# Patient Record
(demographics unavailable — no encounter records)

---

## 2021-09-25 NOTE — PCM.DEL
L & D Note





- General Info


Date of Service: 21





- Delivery Note


Labor: Induced by ARM, Induced by Oxytocin


Delivery Outcome: Livebirth


Infant Delivery Method: Spontaneous Vaginal Delivery-Single


Infant Delivery Mode: Spontaneous


Birth Presentation: Left Occiput Anterior (ACE)


Nuchal Cord: None


Anesthesia Type: Epidural


Amniotic Fluid Description: Clear


Episiotomy Type: None


Laceration: None


Placenta: Intact, Spontaneous


Cord: 3 Vessels


Estimated Blood Loss: 100


Resuscitation Needed: Yes


[place]: Bulb Syringe, Stimulated, Warmed, [place] Used


Delivery Comments (Free Text/Narrative):: 





Patient found to be complete and began pushing.  With maternal pushing effort 

fetal head delivered from CAE presentation.  No nuchal cord present.  With 

gentle downward traction the fetal shoulders and body delivered.  Infant placed 

on maternal abdomen.  Cord clamped and cut.  Cord blood obtained.  Placenta al

lowed time to separate and expelled intact.  inspection of perineum with no 

lacerations 





- General Info


Date of Service: 21





- Patient Data


Vitals - Most Recent: 


                                Last Vital Signs











Temp  37.1 C   21 08:33


 


Pulse  78   21 08:33


 


Resp  16   21 08:33


 


BP  113/63   21 08:33


 


Pulse Ox  100   21 08:33











Weight - Most Recent: 86.954 kg


I&O - Last 24 Hours: 


                                 Intake & Output











 21





 22:59 06:59 14:59


 


Intake Total   2440


 


Balance   2440














- Exam


Urinary Catheter Total Time: 0Days  1Hours





- Problem List & Annotations


(1) 39 weeks gestation of pregnancy


SNOMED Code(s): 89565848


   Code(s): Z3A.39 - 39 WEEKS GESTATION OF PREGNANCY   Status: Acute   Current 

Visit: Yes   





(2) Vaginal delivery


SNOMED Code(s): 831812878


   Code(s): O80 - ENCOUNTER FOR FULL-TERM UNCOMPLICATED DELIVERY   Status: Acute

  Current Visit: No   





- Problem List Review


Problem List Initiated/Reviewed/Updated: Yes





- My Orders


Last 24 Hours: 


My Active Orders





21 Breakfast


Regular Diet [DIET] 





21 07:13


Activity as Tolerated [RC] PFP 


Communication Order [RC] ASDIRECTED 


Fetal Non Stress Test [RC] PER UNIT ROUTINE 


Notify Provider [RC] PFP 


Notify Provider [RC] PRN 


Pump Management, Intrathecal [RC] ASDIRECTED 


Urinary Catheter Assessment [RC] ASDIRECTED 


Vital Signs [RC] PER UNIT ROUTINE 


Calcium Carbonate [Tums]   1,000 mg PO Q2H PRN 


Nalbuphine [Nubain]   10 mg IVPUSH Q2H PRN 


Ondansetron [Zofran]   4 mg IVPUSH Q4H PRN 


Sodium Chloride 0.9% [Saline Flush]   10 ml FLUSH ASDIRECTED PRN 


Electronic Fetal Heart Tones Ext w TOCO [WOMSER] Routine 


Electronic Fetal Heart Tones Internal [WOMSER] Per Unit Routine 


Peripheral IV Insertion Adult [OM.PC] Routine 


Resuscitation Status Routine 





21 07:14


Patient Status [ADT] Routine 


Fetal Heart Tones [RC] ASDIRECTED 


Peripheral IV Care [RC] .AS DIRECTED 





21 07:15


Lactated Ringers [Ringers, Lactated] 1,000 ml IV ASDIRECTED 


Oxytocin/Lactated Ringers [Pitocin in LR 10 Units/1,000 ML] 10 unit in 1,000 ml 

IV .CONTINUOUS 


Oxytocin/Lactated Ringers [Pitocin in LR 10 Units/1,000 ML] 10 unit in 1,000 ml 

IV TITRATE 





21 07:50


HEP C VIRUS AB [REF] Stat 


RAPID PLASMA REAGIN,RPR [CHEM] Routine 














- Assessment


Assessment:: 





PPD#0





- Plan


Plan:: 





* Routine cares 


* Breast feeding 


* Discharge home in 1-2 days

## 2021-09-25 NOTE — PCM.LDHP
L&D History of Present Illness





- General


Date of Service: 21


Admit Problem/Dx: 


                   Patient Status Order with Admit Dx/Problem





21 07:14


Patient Status [ADT] Routine 








                           Admission Diagnosis/Problem











Admission Diagnosis/Problem    Pregnancy














Source of Information: Patient


History Limitations: Reports: No Limitations





- History of Present Illness


Introduction:: 





Patient is a 26 y/o  at 39 0/7 wks who presents for elective IOL.  Doing

 well today.  No concerns 





- Related Data


Allergies/Adverse Reactions: 


                                    Allergies











Allergy/AdvReac Type Severity Reaction Status Date / Time


 


No Known Allergies Allergy   Verified 19 20:37











Home Medications: 


                                    Home Meds





Pnv No.95/Ferrous Fum/Folic AC [Prenatal Tablet] 1 tab PO DAILY 19 

[History]


Acetaminophen [Tylenol] 650 mg PO Q4H PRN  tablet 19 [Rx]


Ibuprofen [Motrin] 600 mg PO Q6H PRN  tablet 19 [Rx]











Past Medical History


OB/GYN History: Reports: Pregnancy


: 2


Para: 1


LMP (Approximate): Pregnant





- Infectious Disease History


Infectious Disease History: Reports: Herpes


Other Infectious Disease History: no active outbreak in 3 years





- Past Surgical History


HEENT Surgical History: Reports: Oral Surgery





Social & Family History





- Family History


Family Medical History: No Pertinent Family History





- Tobacco Use


Tobacco Use Status *Q: Never Tobacco User





- Caffeine Use


Caffeine Use: Reports: None





- Alcohol Use


Alcohol Use History: No





- Recreational Drug Use


Recreational Drug Use: No





H&P Review of Systems





- Review of Systems:


Review Of Systems: See Below


General: Reports: No Symptoms


Pulmonary: Reports: No Symptoms


Cardiovascular: Reports: No Symptoms


Gastrointestinal: Reports: No Symptoms


Genitourinary: Reports: No Symptoms


Musculoskeletal: Reports: No Symptoms


Psychiatric: Reports: No Symptoms





L&D Exam





- Exam


Exam: See Below





- Vital Signs


Vital Signs: 


                                Last Vital Signs











Temp  37.1 C   21 08:33


 


Pulse  78   21 08:33


 


Resp  16   21 08:33


 


BP  113/63   21 08:33


 


Pulse Ox  100   21 08:33











Weight: 86.954 kg





- OB Specific


Contraction Intensity: Irritability


Fetal Movement: Active


Fetal Heart Tones: Present


Fetal Heart Tones per Min: 140


Fetal Heart Rate (FHR) Variability: Moderate (6-25 bpm)


Birth Presentation: Vertex





- Leavitt Score


Leavitt Score Cervix Position: Midposition


Leavitt Score Consistency: Soft


Leavitt Score Effacement: 51-70%


Leavitt Score Dilation: 3-4 cm


Leavitt Score Infant's Station: -2


Leavitt Score Total: 8





- Exam


General: Alert, Oriented, Cooperative


Lungs: Clear to Auscultation, Normal Respiratory Effort


Cardiovascular: Regular Rate, Regular Rhythm


GI/Abdominal Exam: Soft, Non-Tender


Genitourinary: Normal external exam


Extremities: Normal Inspection


Skin: Warm, Dry, Intact





- Patient Data


Lab Results Last 24 hrs: 


                         Laboratory Results - last 24 hr











  21 Range/Units





  07:50 


 


WBC  9.91  (3.98-10.04)  K/mm3


 


RBC  3.87 L  (3.98-5.22)  M/mm3


 


Hgb  10.9 L  (11.2-15.7)  gm/dl


 


Hct  34.6  (34.1-44.9)  %


 


MCV  89.4  (79.4-94.8)  fl


 


MCH  28.2  (25.6-32.2)  pg


 


MCHC  31.5 L  (32.2-35.5)  g/dl


 


RDW Std Deviation  43.0  (36.4-46.3)  fL


 


Plt Count  220  (182-369)  K/mm3


 


MPV  12.4 H  (9.4-12.3)  fl


 


Neut % (Auto)  62.0  (34.0-71.1)  %


 


Lymph % (Auto)  27.3  (19.3-51.7)  %


 


Mono % (Auto)  9.1  (4.7-12.5)  %


 


Eos % (Auto)  1.1  (0.7-5.8)  


 


Baso % (Auto)  0.2  (0.1-1.2)  %


 


Neut # (Auto)  6.14 H  (1.56-6.13)  K/mm3


 


Lymph # (Auto)  2.71  (1.18-3.74)  K/mm3


 


Mono # (Auto)  0.90 H  (0.24-0.36)  K/mm3


 


Eos # (Auto)  0.11  (0.04-0.36)  K/mm3


 


Baso # (Auto)  0.02  (0.01-0.08)  K/mm3











Result Diagrams: 


                                 21 07:50








- Problem List


(1) 39 weeks gestation of pregnancy


SNOMED Code(s): 25391354


   ICD Code: Z3A.39 - 39 WEEKS GESTATION OF PREGNANCY   Status: Acute   Current 

Visit: Yes   


Problem List Initiated/Reviewed/Updated: Yes


Orders Last 24hrs: 


                               Active Orders 24 hr











 Category Date Time Status


 


 Patient Status [ADT] Routine ADT  21 07:14 Active


 


 Activity as Tolerated [RC] PFP Care  21 07:13 Active


 


 Communication Order [RC] ASDIRECTED Care  21 07:13 Active


 


 Fetal Heart Tones [RC] ASDIRECTED Care  21 07:14 Active


 


 Fetal Non Stress Test [RC] PER UNIT ROUTINE Care  21 07:13 Active


 


 Notify Provider [RC] PFP Care  21 07:13 Active


 


 Notify Provider [RC] PRN Care  21 07:13 Active


 


 Peripheral IV Care [RC] .AS DIRECTED Care  21 07:14 Active


 


 Pump Management, Intrathecal [RC] ASDIRECTED Care  21 07:13 Active


 


 Urinary Catheter Assessment [RC] ASDIRECTED Care  21 07:13 Active


 


 Vital Signs [RC] PER UNIT ROUTINE Care  21 07:13 Active


 


 Regular Diet [DIET] Diet  21 Breakfast Active


 


 CORONAVIRUS COVID-19 CAROL [MOLEC] Stat Lab  21 07:46 Received


 


 HEP C VIRUS AB [REF] Stat Lab  21 07:50 Received


 


 RAPID PLASMA REAGIN,RPR [CHEM] Routine Lab  21 07:50 Received


 


 TYPE AND SCREEN [BBK] Stat Lab  21 07:50 Received


 


 Calcium Carbonate [Tums] Med  21 07:13 Active





 1,000 mg PO Q2H PRN   


 


 Lactated Ringers [Ringers, Lactated] 1,000 ml Med  21 07:15 Active





 IV ASDIRECTED   


 


 Nalbuphine [Nubain] Med  21 07:13 Active





 10 mg IVPUSH Q2H PRN   


 


 Ondansetron [Zofran] Med  21 07:13 Active





 4 mg IVPUSH Q4H PRN   


 


 Oxytocin/Lactated Ringers [Pitocin in LR 10 Units/1,000 Med  21 07:15 

Active





 ML]   





 10 unit in 1,000 ml IV .CONTINUOUS   


 


 Oxytocin/Lactated Ringers [Pitocin in LR 10 Units/1,000 Med  21 07:15 

Active





 ML]   





 10 unit in 1,000 ml IV TITRATE   


 


 Sodium Chloride 0.9% [Saline Flush] Med  21 07:13 Active





 10 ml FLUSH ASDIRECTED PRN   


 


 Electronic Fetal Heart Tones Ext w TOCO [WOMSER] Oth  21 07:13 Ordered





 Routine   


 


 Electronic Fetal Heart Tones Internal [WOMSER] Per Unit Ot  21 07:13 

Ordered





 Routine   


 


 Peripheral IV Insertion Adult [OM.PC] Routine Oth  21 07:13 Ordered


 


 Resuscitation Status Routine Resus Stat  21 07:13 Ordered








                                Medication Orders





Calcium Carbonate/Glycine (Calcium Carbonate 500 Mg Tab.Chew)  1,000 mg PO Q2H 

PRN


   PRN Reason: Indigestion


Oxytocin/Lactated Ringer's (Pitocin In Lr 10 Units/1,000 Ml)  10 unit in 1,000 

mls @ 12 mls/hr IV TITRATE BHUPINDER; Protocol


Oxytocin/Lactated Ringer's (Pitocin In Lr 10 Units/1,000 Ml)  10 unit in 1,000 

mls @ 500 mls/hr IV .CONTINUOUS BHUPINDER


Lactated Ringer's (Ringers, Lactated)  1,000 mls @ 100 mls/hr IV ASDIRECTED BHUPINDER


Nalbuphine HCl (Nalbuphine 10 Mg/1 Ml Vial)  10 mg IVPUSH Q2H PRN


   PRN Reason: Pain


Ondansetron HCl (Ondansetron 4 Mg/2 Ml Sdv)  4 mg IVPUSH Q4H PRN


   PRN Reason: Nausea/Vomiting


Sodium Chloride (Sodium Chloride 0.9% 10 Ml Syringe)  10 ml FLUSH ASDIRECTED PRN


   PRN Reason: Keep Vein Open








Assessment/Plan Comment:: 





* Labs 


* GBS negative


* AROM for IOL, pitocin if needed 


* Pain management per patient preference 


* Anticipate

## 2021-09-25 NOTE — PCM.PREANE
Preanesthetic Assessment





- Procedure


Proposed Procedure: 





anila





- Anesthesia/Transfusion/Family Hx


Anesthesia History: Prior Anesthesia Without Reaction


Family History of Anesthesia Reaction: No


Transfusion History: No Prior Transfusion(s)





- Review of Systems


General: No Symptoms


Pulmonary: No Symptoms


Cardiovascular: No Symptoms


Gastrointestinal: No Symptoms


Neurological: No Symptoms


Other: Reports: None





- Physical Assessment


Vital Signs: 





                                Last Vital Signs











Temp  98.8 F   09/25/21 08:33


 


Pulse  78   09/25/21 08:33


 


Resp  16   09/25/21 08:33


 


BP  113/63   09/25/21 08:33


 


Pulse Ox  100   09/25/21 08:33











Height: 5 ft 8 in


Weight: 86.954 kg


ASA Class: 2


Mental Status: Alert & Oriented x3


Airway Class: Mallampati = 2


Dentition: Reports: Normal Dentition


Thyro-Mental Finger Breadths: 3


Mouth Opening Finger Breadths: 3


ROM/Head Extension: Full


Lungs: Clear to Auscultation, Normal Respiratory Effort


Cardiovascular: Regular Rate, Regular Rhythm





- Lab


Values: 





                             Laboratory Last Values











WBC  9.91 K/mm3 (3.98-10.04)   09/25/21  07:50    


 


RBC  3.87 M/mm3 (3.98-5.22)  L  09/25/21  07:50    


 


Hgb  10.9 gm/dl (11.2-15.7)  L  09/25/21  07:50    


 


Hct  34.6 % (34.1-44.9)   09/25/21  07:50    


 


MCV  89.4 fl (79.4-94.8)   09/25/21  07:50    


 


MCH  28.2 pg (25.6-32.2)   09/25/21  07:50    


 


MCHC  31.5 g/dl (32.2-35.5)  L  09/25/21  07:50    


 


RDW Std Deviation  43.0 fL (36.4-46.3)   09/25/21  07:50    


 


Plt Count  220 K/mm3 (182-369)   09/25/21  07:50    


 


MPV  12.4 fl (9.4-12.3)  H  09/25/21  07:50    


 


Neut % (Auto)  62.0 % (34.0-71.1)   09/25/21  07:50    


 


Lymph % (Auto)  27.3 % (19.3-51.7)   09/25/21  07:50    


 


Mono % (Auto)  9.1 % (4.7-12.5)   09/25/21  07:50    


 


Eos % (Auto)  1.1  (0.7-5.8)   09/25/21  07:50    


 


Baso % (Auto)  0.2 % (0.1-1.2)   09/25/21  07:50    


 


Neut # (Auto)  6.14 K/mm3 (1.56-6.13)  H  09/25/21  07:50    


 


Lymph # (Auto)  2.71 K/mm3 (1.18-3.74)   09/25/21  07:50    


 


Mono # (Auto)  0.90 K/mm3 (0.24-0.36)  H  09/25/21  07:50    


 


Eos # (Auto)  0.11 K/mm3 (0.04-0.36)   09/25/21  07:50    


 


Baso # (Auto)  0.02 K/mm3 (0.01-0.08)   09/25/21  07:50    


 


SARS-CoV-2 RNA (CAROL)  Negative  (NEGATIVE)   09/25/21  07:46    


 


Blood Type  B POSITIVE   09/25/21  07:50    


 


Gel Antibody Screen  Negative   09/25/21  07:50    














- Allergies


Allergies/Adverse Reactions: 


                                    Allergies











Allergy/AdvReac Type Severity Reaction Status Date / Time


 


No Known Allergies Allergy   Verified 01/24/19 20:37














- Blood


Blood Available: No





- Acknowledgements


Anesthesia Type Planned: Epidural


Pt an Appropriate Candidate for the Planned Anesthesia: Yes


Alternatives and Risks of Anesthesia Discussed w Pt/Guardian: Yes


Pt/Guardian Understands and Agrees with Anesthesia Plan: Yes





PreAnesthesia Questionnaire





- Past Health History


Medical/Surgical History: Denies Medical/Surgical History


Cardiovascular History: Reports: None


Respiratory History: Reports: None


OB/GYN History: Reports: Pregnancy


Psychiatric History: Reports: None





- Infectious Disease History


Infectious Disease History: Reports: Herpes


Other Infectious Disease History: no active outbreak in 3 years





- Past Surgical History


HEENT Surgical History: Reports: Oral Surgery





- SUBSTANCE USE


Tobacco Use Status *Q: Never Tobacco User


Tobacco Use Within Last Twelve Months: No


Second Hand Smoke Exposure: No


Days Per Week of Alcohol Use: 0


Recreational Drug Use History: No





- HOME MEDS


Home Medications: 


                                    Home Meds





Pnv No.95/Ferrous Fum/Folic AC [Prenatal Tablet] 1 tab PO DAILY 01/24/19 

[History]


Acetaminophen [Tylenol] 650 mg PO Q4H PRN  tablet 01/26/19 [Rx]


Ibuprofen [Motrin] 600 mg PO Q6H PRN  tablet 01/26/19 [Rx]











- CURRENT (IN HOUSE) MEDS


Current Meds: 





                               Current Medications





Calcium Carbonate/Glycine (Calcium Carbonate 500 Mg Tab.Chew)  1,000 mg PO Q2H 

PRN


   PRN Reason: Indigestion


Diphenhydramine HCl (Diphenhydramine 50 Mg/Ml Sdv)  25 mg IVPUSH Q6H PRN


   PRN Reason: pruritis


Ephedrine Sulfate (Ephedrine 50 Mg/Ml Sdv)  5 mg IVPUSH ASDIRECTED PRN


   PRN Reason: Hypotension


Fentanyl (Fentanyl 100 Mcg/2 Ml Sdv)  100 mcg EPIDUR Q3H PRN


   PRN Reason: Pain


   Last Admin: 09/25/21 10:44 Dose:  100 mcg


   Documented by: 


Fentanyl/Bupivacaine HCl (Bupivacaine/Fentanyl/Ns 100 Ml Bag)  100 ml EPIDUR 

ASDIRECTED PRN


   PRN Reason: Pain


Oxytocin/Lactated Ringer's (Pitocin In Lr 10 Units/1,000 Ml)  10 unit in 1,000 

mls @ 12 mls/hr IV TITRATE BHUPINDER; Protocol


Oxytocin/Lactated Ringer's (Pitocin In Lr 10 Units/1,000 Ml)  10 unit in 1,000 

mls @ 500 mls/hr IV .CONTINUOUS BHUPINDER


Lactated Ringer's (Ringers, Lactated)  1,000 mls @ 100 mls/hr IV ASDIRECTED BHUPINDER


   Last Admin: 09/25/21 10:46 Dose:  100 mls/hr


   Documented by: 


Nalbuphine HCl (Nalbuphine 10 Mg/1 Ml Vial)  10 mg IVPUSH Q2H PRN


   PRN Reason: Pain


Ondansetron HCl (Ondansetron 4 Mg/2 Ml Sdv)  4 mg IVPUSH Q4H PRN


   PRN Reason: Nausea/Vomiting


Sodium Chloride (Sodium Chloride 0.9% 10 Ml Syringe)  10 ml FLUSH ASDIRECTED PRN


   PRN Reason: Keep Vein Open

## 2021-09-26 NOTE — PCM.[email or web address]
**Discharge Summary





- Discharge Data


Discharge Date: 21


Discharge Disposition: Home, Self-Care 01


Condition: Good





- Referral to Home Health


Primary Care Physician: 


Heather Chung MD








- Discharge Diagnosis/Problem(s)


(1) 39 weeks gestation of pregnancy


SNOMED Code(s): 86160919


   ICD Code: Z3A.39 - 39 WEEKS GESTATION OF PREGNANCY   Status: Acute   Current 

Visit: Yes   





(2) Vaginal delivery


SNOMED Code(s): 620128950


   ICD Code: O80 - ENCOUNTER FOR FULL-TERM UNCOMPLICATED DELIVERY   Status: 

Acute   Current Visit: No   





- Patient Summary/Data


Complications: None


Consults: 


None


Recommended Follow-up Testing/Procedures: 


Follow up in 3 weeks for postpartum check 


Hospital Course: 





28 y/o  at 39 0/7 wks presented for elective IOL.  Pitocin and AROM done

for IOL.  Progressed well to complete dilation.  Underwent an uncomplicated .

 See delivery note.  Postpartum did well and was discharged home on PPD#1





- Patient Instructions


Diet: Regular Diet as Tolerated


Activity: As Tolerated


Activity, Other: Pelvic rest for 6 weeks 


Driving: May Drive Today


Showering/Bathing: May Shower


Showering/Bathing, Other: May Bathe 


Notify Provider of: Fever, Increased Pain, Swelling and Redness, Drainage, 

Nausea and/or Vomiting





- Discharge Plan


*PRESCRIPTION DRUG MONITORING PROGRAM REVIEWED*: No


*COPY OF PRESCRIPTION DRUG MONITORING REPORT IN PATIENT [facility]: No


Home Medications: 


                                    Home Meds





Pnv No.95/Ferrous Fum/Folic AC [Prenatal Tablet] 1 tab PO DAILY 19 

[History]


Acetaminophen [Tylenol] 650 mg PO Q4H PRN  tablet 19 [Rx]


Ibuprofen [Motrin] 600 mg PO Q6H PRN  tablet 19 [Rx]


Docusate Sodium [Colace] 100 mg PO BID PRN  cap 21 [Rx]








Referrals: 


Heather Chung MD [Primary Care Provider] -  (3 weeks for postpartum check )





- Discharge Summary/Plan Comment


DC Time >30 min.: No


Total # of Minutes for Discharge Time: 


15





- Patient Data


Vitals - Most Recent: 


                                Last Vital Signs











Temp  36.7 C   21 02:52


 


Pulse  87   21 02:53


 


Resp  14   21 02:52


 


BP  110/73   21 02:53


 


Pulse Ox  100   21 02:53











Weight - Most Recent: 86.954 kg


I&O - Last 24 hours: 


                                 Intake & Output











 21





 14:59 22:59 06:59


 


Intake Total 2440 1680 


 


Output Total 350  


 


Balance 2090 1680 











Lab Results - Last 24 hrs: 


                         Laboratory Results - last 24 hr











  21 Range/Units





  07:46 07:50 07:50 


 


WBC    9.91  (3.98-10.04)  K/mm3


 


RBC    3.87 L  (3.98-5.22)  M/mm3


 


Hgb    10.9 L  (11.2-15.7)  gm/dl


 


Hct    34.6  (34.1-44.9)  %


 


MCV    89.4  (79.4-94.8)  fl


 


MCH    28.2  (25.6-32.2)  pg


 


MCHC    31.5 L  (32.2-35.5)  g/dl


 


RDW Std Deviation    43.0  (36.4-46.3)  fL


 


Plt Count    220  (182-369)  K/mm3


 


MPV    12.4 H  (9.4-12.3)  fl


 


Neut % (Auto)    62.0  (34.0-71.1)  %


 


Lymph % (Auto)    27.3  (19.3-51.7)  %


 


Mono % (Auto)    9.1  (4.7-12.5)  %


 


Eos % (Auto)    1.1  (0.7-5.8)  


 


Baso % (Auto)    0.2  (0.1-1.2)  %


 


Neut # (Auto)    6.14 H  (1.56-6.13)  K/mm3


 


Lymph # (Auto)    2.71  (1.18-3.74)  K/mm3


 


Mono # (Auto)    0.90 H  (0.24-0.36)  K/mm3


 


Eos # (Auto)    0.11  (0.04-0.36)  K/mm3


 


Baso # (Auto)    0.02  (0.01-0.08)  K/mm3


 


RPR   Non-reactive   (NONREACTIVE)  


 


SARS-CoV-2 RNA (CAROL)  Negative    (NEGATIVE)  


 


Blood Type     


 


Gel Antibody Screen     














  21 Range/Units





  07:50 


 


WBC   (3.98-10.04)  K/mm3


 


RBC   (3.98-5.22)  M/mm3


 


Hgb   (11.2-15.7)  gm/dl


 


Hct   (34.1-44.9)  %


 


MCV   (79.4-94.8)  fl


 


MCH   (25.6-32.2)  pg


 


MCHC   (32.2-35.5)  g/dl


 


RDW Std Deviation   (36.4-46.3)  fL


 


Plt Count   (182-369)  K/mm3


 


MPV   (9.4-12.3)  fl


 


Neut % (Auto)   (34.0-71.1)  %


 


Lymph % (Auto)   (19.3-51.7)  %


 


Mono % (Auto)   (4.7-12.5)  %


 


Eos % (Auto)   (0.7-5.8)  


 


Baso % (Auto)   (0.1-1.2)  %


 


Neut # (Auto)   (1.56-6.13)  K/mm3


 


Lymph # (Auto)   (1.18-3.74)  K/mm3


 


Mono # (Auto)   (0.24-0.36)  K/mm3


 


Eos # (Auto)   (0.04-0.36)  K/mm3


 


Baso # (Auto)   (0.01-0.08)  K/mm3


 


RPR   (NONREACTIVE)  


 


SARS-CoV-2 RNA (CAROL)   (NEGATIVE)  


 


Blood Type  B POSITIVE  


 


Gel Antibody Screen  Negative  











Med Orders - Current: 


                               Current Medications





Acetaminophen (Acetaminophen 325 Mg Tab)  650 mg PO Q4H PRN


   PRN Reason: mild pain or fever


Benzocaine/Menthol (Benzocaine/Menthol 20%-0.5% Spray 78 Gm Cannister)  0 gm TOP

ASDIRECTED PRN


   PRN Reason: Perineal Comfort Measure


Docusate Sodium (Docusate Sodium 100 Mg Cap)  100 mg PO BID PRN


   PRN Reason: Constipation


Ibuprofen (Ibuprofen 600 Mg Tab)  600 mg PO Q6H PRN


   PRN Reason: Mild pain or fever


   Last Admin: 21 17:56 Dose:  600 mg


   Documented by: 


Witch Hazel (Witch Hazel Medicated Pads 40/Jar)  1 pad TOP ASDIRECTED PRN


   PRN Reason: Perineal Comfort Measure





Discontinued Medications





Calcium Carbonate/Glycine (Calcium Carbonate 500 Mg Tab.Chew)  1,000 mg PO Q2H 

PRN


   PRN Reason: Indigestion


Diphenhydramine HCl (Diphenhydramine 50 Mg/Ml Sdv)  25 mg IVPUSH Q6H PRN


   PRN Reason: pruritis


Ephedrine Sulfate (Ephedrine 50 Mg/Ml Sdv)  5 mg IVPUSH ASDIRECTED PRN


   PRN Reason: Hypotension


Fentanyl (Fentanyl 100 Mcg/2 Ml Sdv)  100 mcg EPIDUR Q3H PRN


   PRN Reason: Pain


   Last Admin: 21 10:44 Dose:  100 mcg


   Documented by: 


Fentanyl/Bupivacaine HCl (Bupivacaine/Fentanyl/Ns 100 Ml Bag)  100 ml EPIDUR 

ASDIRECTED PRN


   PRN Reason: Pain


   Last Admin: 21 11:13 Dose:  100 ml


   Documented by: 


Oxytocin/Lactated Ringer's (Pitocin In Lr 10 Units/1,000 Ml)  10 unit in 1,000 

mls @ 12 mls/hr IV TITRATE BHUPINDER; Protocol


   Last Titration: 21 13:49 Dose:  6 munits/min, 36 mls/hr


   Documented by: 


Oxytocin/Lactated Ringer's (Pitocin In Lr 10 Units/1,000 Ml)  10 unit in 1,000 

mls @ 500 mls/hr IV .CONTINUOUS BHUPINDER


Lactated Ringer's (Ringers, Lactated)  1,000 mls @ 100 mls/hr IV ASDIRECTED BHUPINDER


   Last Admin: 21 12:59 Dose:  100 mls/hr


   Documented by: 


Nalbuphine HCl (Nalbuphine 10 Mg/1 Ml Vial)  10 mg IVPUSH Q2H PRN


   PRN Reason: Pain


Ondansetron HCl (Ondansetron 4 Mg/2 Ml Sdv)  4 mg IVPUSH Q4H PRN


   PRN Reason: Nausea/Vomiting


Sodium Chloride (Sodium Chloride 0.9% 10 Ml Syringe)  10 ml FLUSH ASDIRECTED PRN


   PRN Reason: Keep Vein Open

## 2021-09-26 NOTE — PCM.PNPP
- General Info


Date of Service: 21


Functional Status: Reports: Pain Controlled, Tolerating Diet, Ambulating, 

Urinating





- Review of Systems


General: Reports: No Symptoms


Pulmonary: Reports: No Symptoms


Cardiovascular: Reports: No Symptoms


Gastrointestinal: Reports: No Symptoms


Genitourinary: Reports: No Symptoms


Musculoskeletal: Reports: No Symptoms


Neurological: Reports: No Symptoms





- General Info


Date of Service: 21





- Patient Data


Vital Signs - Most Recent: 


                                Last Vital Signs











Temp  36.7 C   21 02:52


 


Pulse  87   21 02:53


 


Resp  14   21 02:52


 


BP  110/73   21 02:53


 


Pulse Ox  100   21 02:53











Weight - Most Recent: 86.954 kg


I&O - Last 24 Hours: 


                                 Intake & Output











 21





 14:59 22:59 06:59


 


Intake Total 2440 1680 


 


Output Total 350  


 


Balance 2090 1680 











Lab Results - Last 24 Hours: 


                         Laboratory Results - last 24 hr











  21 Range/Units





  07:46 07:50 07:50 


 


WBC    9.91  (3.98-10.04)  K/mm3


 


RBC    3.87 L  (3.98-5.22)  M/mm3


 


Hgb    10.9 L  (11.2-15.7)  gm/dl


 


Hct    34.6  (34.1-44.9)  %


 


MCV    89.4  (79.4-94.8)  fl


 


MCH    28.2  (25.6-32.2)  pg


 


MCHC    31.5 L  (32.2-35.5)  g/dl


 


RDW Std Deviation    43.0  (36.4-46.3)  fL


 


Plt Count    220  (182-369)  K/mm3


 


MPV    12.4 H  (9.4-12.3)  fl


 


Neut % (Auto)    62.0  (34.0-71.1)  %


 


Lymph % (Auto)    27.3  (19.3-51.7)  %


 


Mono % (Auto)    9.1  (4.7-12.5)  %


 


Eos % (Auto)    1.1  (0.7-5.8)  


 


Baso % (Auto)    0.2  (0.1-1.2)  %


 


Neut # (Auto)    6.14 H  (1.56-6.13)  K/mm3


 


Lymph # (Auto)    2.71  (1.18-3.74)  K/mm3


 


Mono # (Auto)    0.90 H  (0.24-0.36)  K/mm3


 


Eos # (Auto)    0.11  (0.04-0.36)  K/mm3


 


Baso # (Auto)    0.02  (0.01-0.08)  K/mm3


 


RPR   Non-reactive   (NONREACTIVE)  


 


SARS-CoV-2 RNA (CAROL)  Negative    (NEGATIVE)  


 


Blood Type     


 


Gel Antibody Screen     














  21 Range/Units





  07:50 


 


WBC   (3.98-10.04)  K/mm3


 


RBC   (3.98-5.22)  M/mm3


 


Hgb   (11.2-15.7)  gm/dl


 


Hct   (34.1-44.9)  %


 


MCV   (79.4-94.8)  fl


 


MCH   (25.6-32.2)  pg


 


MCHC   (32.2-35.5)  g/dl


 


RDW Std Deviation   (36.4-46.3)  fL


 


Plt Count   (182-369)  K/mm3


 


MPV   (9.4-12.3)  fl


 


Neut % (Auto)   (34.0-71.1)  %


 


Lymph % (Auto)   (19.3-51.7)  %


 


Mono % (Auto)   (4.7-12.5)  %


 


Eos % (Auto)   (0.7-5.8)  


 


Baso % (Auto)   (0.1-1.2)  %


 


Neut # (Auto)   (1.56-6.13)  K/mm3


 


Lymph # (Auto)   (1.18-3.74)  K/mm3


 


Mono # (Auto)   (0.24-0.36)  K/mm3


 


Eos # (Auto)   (0.04-0.36)  K/mm3


 


Baso # (Auto)   (0.01-0.08)  K/mm3


 


RPR   (NONREACTIVE)  


 


SARS-CoV-2 RNA (CAROL)   (NEGATIVE)  


 


Blood Type  B POSITIVE  


 


Gel Antibody Screen  Negative  











Med Orders - Current: 


                               Current Medications





Acetaminophen (Acetaminophen 325 Mg Tab)  650 mg PO Q4H PRN


   PRN Reason: mild pain or fever


Benzocaine/Menthol (Benzocaine/Menthol 20%-0.5% Spray 78 Gm Cannister)  0 gm TOP

ASDIRECTED PRN


   PRN Reason: Perineal Comfort Measure


Docusate Sodium (Docusate Sodium 100 Mg Cap)  100 mg PO BID PRN


   PRN Reason: Constipation


Ibuprofen (Ibuprofen 600 Mg Tab)  600 mg PO Q6H PRN


   PRN Reason: Mild pain or fever


   Last Admin: 21 17:56 Dose:  600 mg


   Documented by: 


Witch Hazel (Witch Hazel Medicated Pads 40/Jar)  1 pad TOP ASDIRECTED PRN


   PRN Reason: Perineal Comfort Measure





Discontinued Medications





Calcium Carbonate/Glycine (Calcium Carbonate 500 Mg Tab.Chew)  1,000 mg PO Q2H 

PRN


   PRN Reason: Indigestion


Diphenhydramine HCl (Diphenhydramine 50 Mg/Ml Sdv)  25 mg IVPUSH Q6H PRN


   PRN Reason: pruritis


Ephedrine Sulfate (Ephedrine 50 Mg/Ml Sdv)  5 mg IVPUSH ASDIRECTED PRN


   PRN Reason: Hypotension


Fentanyl (Fentanyl 100 Mcg/2 Ml Sdv)  100 mcg EPIDUR Q3H PRN


   PRN Reason: Pain


   Last Admin: 21 10:44 Dose:  100 mcg


   Documented by: 


Fentanyl/Bupivacaine HCl (Bupivacaine/Fentanyl/Ns 100 Ml Bag)  100 ml EPIDUR 

ASDIRECTED PRN


   PRN Reason: Pain


   Last Admin: 21 11:13 Dose:  100 ml


   Documented by: 


Oxytocin/Lactated Ringer's (Pitocin In Lr 10 Units/1,000 Ml)  10 unit in 1,000 

mls @ 12 mls/hr IV TITRATE BHUPINDER; Protocol


   Last Titration: 21 13:49 Dose:  6 munits/min, 36 mls/hr


   Documented by: 


Oxytocin/Lactated Ringer's (Pitocin In Lr 10 Units/1,000 Ml)  10 unit in 1,000 

mls @ 500 mls/hr IV .CONTINUOUS BHUPINDER


Lactated Ringer's (Ringers, Lactated)  1,000 mls @ 100 mls/hr IV ASDIRECTED BHUPINDER


   Last Admin: 21 12:59 Dose:  100 mls/hr


   Documented by: 


Nalbuphine HCl (Nalbuphine 10 Mg/1 Ml Vial)  10 mg IVPUSH Q2H PRN


   PRN Reason: Pain


Ondansetron HCl (Ondansetron 4 Mg/2 Ml Sdv)  4 mg IVPUSH Q4H PRN


   PRN Reason: Nausea/Vomiting


Sodium Chloride (Sodium Chloride 0.9% 10 Ml Syringe)  10 ml FLUSH ASDIRECTED PRN


   PRN Reason: Keep Vein Open











- Infant Interaction


Infant Disposition, Postpartum: [place] in Room with Family


Infant Interaction: Holding Infant


Infant Feeding: Attempted Breastfeeding; Nursed Fair/Poor


Support Person: 





- Postpartum Recovery Exam


Fundal Tone: Firm


Fundal Level: 2 Fingerbreadths Below Umbilicus


Fundal Placement: Midline


Lochia Amount: Small


Lochia Color: Rubra/Red


Perineum Description: Intact, Minimal Bruising/Swelling


Bladder Status: Voiding





- Exam


General: Alert, Oriented, Cooperative


GI/Abdominal Exam: Soft, Non-Tender





- Problem List & Annotations


(1) 39 weeks gestation of pregnancy


SNOMED Code(s): 19860141


   Code(s): Z3A.39 - 39 WEEKS GESTATION OF PREGNANCY   Status: Acute   Current 

Visit: Yes   





(2) Vaginal delivery


SNOMED Code(s): 685187212


   Code(s): O80 - ENCOUNTER FOR FULL-TERM UNCOMPLICATED DELIVERY   Status: Acute

  Current Visit: No   





- Problem List Review


Problem List Initiated/Reviewed/Updated: Yes





- My Orders


Last 24 Hours: 


My Active Orders





21 07:13


Resuscitation Status Routine 





21 07:50


HEP C VIRUS AB [REF] Stat 





21 16:00


Acetaminophen [TylenoL]   650 mg PO Q4H PRN 


Benzocaine/Menthol [Dermoplast Pain Relief 20%-0.5% Spray]   See Dose 

Instructions  TOP ASDIRECTED PRN 


Docusate Sodium [Colace]   100 mg PO BID PRN 


Ibuprofen [Motrin]   600 mg PO Q6H PRN 


witch hazeL [Tucks]   1 pad TOP ASDIRECTED PRN 


Heat Therapy [OM.PC] PRN 





21 16:00


Activity as Tolerated [RC] PER UNIT ROUTINE 


Up ad Niya [RC] ASDIRECTED 


Vital Signs [RC] ,,15, 


Assess Lochia [WOMSER] Per Unit Routine 


Assess Uterine Involution [WOMSER] Per Unit Routine 


Breast Pump [WOMSER] Per Unit Routine 


Ice Therapy [OM.PC] Per Unit Routine 


Perineal Care [OM.PC] Per Unit Routine 


Peripheral IV Discontinue [OM.PC] Routine 


Sitz Bath [OM.PC] Per Unit Routine 





21 Dinner


Regular Diet [DIET] 





21 05:29


Ready for Discharge [RC] PER UNIT ROUTINE 





21 16:00


Heat Therapy [OM.PC] PRN 














- Assessment


Assessment:: 





PPD#1





- Plan


Plan:: 





* Routine cares 


* Breast feeding 


* Discharge home today